# Patient Record
Sex: MALE | Race: WHITE
[De-identification: names, ages, dates, MRNs, and addresses within clinical notes are randomized per-mention and may not be internally consistent; named-entity substitution may affect disease eponyms.]

---

## 2022-06-03 LAB
BUN BLD-MCNC: 25 MG/DL (ref 7–18)
GLUCOSE SERPLBLD-MCNC: 97 MG/DL (ref 74–106)
HCT VFR BLD CALC: 47.5 % (ref 39.6–49)
INR BLD: 0.9
LYMPHOCYTES # SPEC AUTO: 1.5 K/UL (ref 0.7–4.9)
PMV BLD: 10.3 FL (ref 7.6–11.3)
POTASSIUM SERPL-SCNC: 4.4 MMOL/L (ref 3.5–5.1)
RBC # BLD: 5.11 M/UL (ref 4.33–5.43)

## 2022-06-03 NOTE — RAD REPORT
EXAM DESCRIPTION:  RAD - Chest Pa And Lat (2 Views) - 6/3/2022 3:17 pm

 

CLINICAL HISTORY:  pre op pending TURBT

Chest pain.

 

COMPARISON:  Chest Pa And Lat (2 Views) dated 7/1/2020; Chest Pa And Lat (2 Views) dated 4/17/2017; C
hest Pa And Lat (2 Views) dated 7/25/2016

 

FINDINGS:  Right hemidiaphragm is elevated, chronic. Mild atelectasis seen in the right lung base. Th
e lungs are otherwise grossly clear. The heart is normal in size. Sternotomy wires.

## 2022-06-07 ENCOUNTER — HOSPITAL ENCOUNTER (OUTPATIENT)
Dept: HOSPITAL 97 - OR | Age: 77
Discharge: HOME | End: 2022-06-07
Attending: UROLOGY
Payer: COMMERCIAL

## 2022-06-07 VITALS — SYSTOLIC BLOOD PRESSURE: 159 MMHG | TEMPERATURE: 96.4 F | DIASTOLIC BLOOD PRESSURE: 64 MMHG | OXYGEN SATURATION: 95 %

## 2022-06-07 DIAGNOSIS — R31.0: ICD-10-CM

## 2022-06-07 DIAGNOSIS — N40.1: ICD-10-CM

## 2022-06-07 DIAGNOSIS — N18.9: ICD-10-CM

## 2022-06-07 DIAGNOSIS — I10: ICD-10-CM

## 2022-06-07 DIAGNOSIS — C67.9: Primary | ICD-10-CM

## 2022-06-07 DIAGNOSIS — Z20.822: ICD-10-CM

## 2022-06-07 DIAGNOSIS — Z85.51: ICD-10-CM

## 2022-06-07 PROCEDURE — 87086 URINE CULTURE/COLONY COUNT: CPT

## 2022-06-07 PROCEDURE — 52234 CYSTOSCOPY AND TREATMENT: CPT

## 2022-06-07 PROCEDURE — 88307 TISSUE EXAM BY PATHOLOGIST: CPT

## 2022-06-07 PROCEDURE — 36415 COLL VENOUS BLD VENIPUNCTURE: CPT

## 2022-06-07 PROCEDURE — 80048 BASIC METABOLIC PNL TOTAL CA: CPT

## 2022-06-07 PROCEDURE — 71046 X-RAY EXAM CHEST 2 VIEWS: CPT

## 2022-06-07 PROCEDURE — 3E0K705 INTRODUCTION OF OTHER ANTINEOPLASTIC INTO GENITOURINARY TRACT, VIA NATURAL OR ARTIFICIAL OPENING: ICD-10-PCS

## 2022-06-07 PROCEDURE — 88305 TISSUE EXAM BY PATHOLOGIST: CPT

## 2022-06-07 PROCEDURE — 51720 TREATMENT OF BLADDER LESION: CPT

## 2022-06-07 PROCEDURE — 0TBB8ZX EXCISION OF BLADDER, VIA NATURAL OR ARTIFICIAL OPENING ENDOSCOPIC, DIAGNOSTIC: ICD-10-PCS

## 2022-06-07 PROCEDURE — 85610 PROTHROMBIN TIME: CPT

## 2022-06-07 PROCEDURE — 85025 COMPLETE CBC W/AUTO DIFF WBC: CPT

## 2022-06-07 PROCEDURE — 87088 URINE BACTERIA CULTURE: CPT

## 2022-06-07 RX ADMIN — LIDOCAINE HYDROCHLORIDE ONE ML: 20 JELLY TOPICAL at 15:29

## 2022-06-07 RX ADMIN — LIDOCAINE HYDROCHLORIDE ONE ML: 20 JELLY TOPICAL at 14:52

## 2023-07-27 LAB
ALBUMIN SERPL BCP-MCNC: 3.9 G/DL (ref 3.4–5)
ALP SERPL-CCNC: 112 U/L (ref 45–117)
ALT SERPL W P-5'-P-CCNC: 21 U/L (ref 16–61)
AST SERPL W P-5'-P-CCNC: 13 U/L (ref 15–37)
BUN BLD-MCNC: 34 MG/DL (ref 7–18)
GLUCOSE SERPLBLD-MCNC: 119 MG/DL (ref 74–106)
HCT VFR BLD CALC: 48.3 % (ref 39.6–49)
INR BLD: 0.8
LYMPHOCYTES # SPEC AUTO: 2 K/UL (ref 0.7–4.9)
MCV RBC: 93.2 FL (ref 80–100)
PMV BLD: 10.2 FL (ref 7.6–11.3)
POTASSIUM SERPL-SCNC: 4.6 MEQ/L (ref 3.5–5.1)
RBC # BLD: 5.18 M/UL (ref 4.33–5.43)
WBC # BLD AUTO: 7.6 THOU/UL (ref 4.3–10.9)

## 2023-07-27 NOTE — RAD REPORT
EXAM DESCRIPTION:  RAD - Chest Pa And Lat (2 Views) - 7/27/2023 2:38 pm

 

CLINICAL HISTORY:  pre op for surgery, htn, quadruple heart bypass

 

COMPARISON:  Chest Pa And Lat (2 Views) dated 6/3/2022; Chest Pa And Lat (2 Views) dated 7/1/2020; Ch
est Pa And Lat (2 Views) dated 4/17/2017; Chest Pa And Lat (2 Views) dated 7/25/2016

 

FINDINGS:  Lines: None.

Lungs: No evidence of edema or pneumonia. Chronically elevated right hemidiaphragm.

Pleural: No significant pleural effusions or pneumothorax.

Cardiac: The heart size is within normal limits.

Mediastinum: Within normal limits.

Bones: No acute fractures. Sternotomy.

Other: None

 

IMPRESSION:  Chronic elevation of the right hemidiaphragm. No new or acute process otherwise identifi
ed.

## 2023-07-28 NOTE — EKG
Test Date:    2023-07-27               Test Time:    14:41:44

Technician:   JAMAR                                     

                                                     

MEASUREMENT RESULTS:                                       

Intervals:                                           

Rate:         80                                     

NV:           266                                    

QRSD:         84                                     

QT:           370                                    

QTc:          426                                    

Axis:                                                

P:                                                   

NV:           266                                    

QRS:          9                                      

T:            81                                     

                                                     

INTERPRETIVE STATEMENTS:                                       

                                                     

Sinus rhythm with 1st degree AV block

Otherwise normal ECG

Compared to ECG 07/01/2020 10:58:00

First degree AV block now present

Atrial premature complex(es) no longer present



Electronically Signed On 07-28-23 13:24:31 CDT by Myke Mederos

## 2023-08-01 ENCOUNTER — HOSPITAL ENCOUNTER (OUTPATIENT)
Dept: HOSPITAL 97 - OR | Age: 78
Discharge: HOME | End: 2023-08-01
Attending: UROLOGY
Payer: COMMERCIAL

## 2023-08-01 VITALS — SYSTOLIC BLOOD PRESSURE: 158 MMHG | OXYGEN SATURATION: 97 % | DIASTOLIC BLOOD PRESSURE: 77 MMHG | TEMPERATURE: 97.1 F

## 2023-08-01 DIAGNOSIS — N40.1: ICD-10-CM

## 2023-08-01 DIAGNOSIS — D09.0: ICD-10-CM

## 2023-08-01 DIAGNOSIS — N32.9: Primary | ICD-10-CM

## 2023-08-01 PROCEDURE — 52204 CYSTOSCOPY W/BIOPSY(S): CPT

## 2023-08-01 PROCEDURE — 85025 COMPLETE CBC W/AUTO DIFF WBC: CPT

## 2023-08-01 PROCEDURE — 52601 PROSTATECTOMY (TURP): CPT

## 2023-08-01 PROCEDURE — 87086 URINE CULTURE/COLONY COUNT: CPT

## 2023-08-01 PROCEDURE — 36415 COLL VENOUS BLD VENIPUNCTURE: CPT

## 2023-08-01 PROCEDURE — 85610 PROTHROMBIN TIME: CPT

## 2023-08-01 PROCEDURE — 87088 URINE BACTERIA CULTURE: CPT

## 2023-08-01 PROCEDURE — 93005 ELECTROCARDIOGRAM TRACING: CPT

## 2023-08-01 PROCEDURE — 0VT08ZZ RESECTION OF PROSTATE, VIA NATURAL OR ARTIFICIAL OPENING ENDOSCOPIC: ICD-10-PCS

## 2023-08-01 PROCEDURE — 80053 COMPREHEN METABOLIC PANEL: CPT

## 2023-08-01 PROCEDURE — 88331 PATH CONSLTJ SURG 1 BLK 1SPC: CPT

## 2023-08-01 PROCEDURE — 71046 X-RAY EXAM CHEST 2 VIEWS: CPT

## 2023-08-01 PROCEDURE — 88305 TISSUE EXAM BY PATHOLOGIST: CPT

## 2023-08-01 PROCEDURE — 0TBB8ZX EXCISION OF BLADDER, VIA NATURAL OR ARTIFICIAL OPENING ENDOSCOPIC, DIAGNOSTIC: ICD-10-PCS

## 2023-08-01 NOTE — OP
Surgeon:  HERMILA FLOREZ



Preoperative Diagnoses:  

1.Erythematous bladder lesion.

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

3.BPH with lower urinary tract obstruction and symptoms.



Postoperative Diagnoses:  

1.Erythematous bladder lesion.

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

3.BPH with lower urinary tract obstruction and symptoms.



Principal Procedures:  

1.Cystoscopy with bladder biopsies and fulguration.

2.Bipolar transurethral resection of the prostate.



Indication For Procedure:  Mr. Pedro is an 78-year-old gentleman with a history of low-grade urothel
ial carcinoma of the bladder with recurrence observed in 2022 of the low-grade urothelial carcinoma. 
 Subsequent followup revealed the presence of an erythematous patch, which was biopsied and revealed 
CIS.  He received an induction course of BCG and presents today for restaging of his bladder cancer. 
 He also had significant obstructive LUTS due to BPH refractory to Flomax plus finasteride that he ha
s been on for 5-6 years.  As a result, he wished to proceed with surgical management of his obstructi
on if possible, and we agreed that if the frozen section pathology of his bladder biopsy was nonsuspi
cious, we would proceed with the TURP, as the area of erythema noted in the bladder was in the region
 of prior biopsy/resection site scar where inflammatory changes frequently seen.



Procedure In Detail:  The patient was consented in the preoperative holding area before being transfe
rred to the operative suite where general anesthesia was induced.  He was given ampicillin 2 g and ge
ntamicin 100 mg IV antimicrobial prophylaxis and pneumo boots were provided for DVT prophylaxis.  He 
was placed in the lithotomy position, padded and secured to the table appropriately after general ane
sthesia was induced.  His genitalia were prepped with Hibiclens and he was draped in standard fashion
.  The case was begun using a 22-[place] rigid cystoscope to traverse the urethra and into the bladder
 with ease.  The significant extended prostatic urethral length as well as interdigitating lateral lo
bar hypertrophy was again noted.  Within the bladder, no other papillary mucosal lesions, foreign bod
ies or stones were noted, and the area of erythema previously observed in the right lateral wall just
 lateral to the right ureteral orifice where a prior biopsy/resection had been undertaken was still n
oted to be somewhat erythematous.  As a result, I used cold cup biopsy forceps and sterile water to b
iopsy a portion of that erythematous tissue and sent it for frozen section analysis.  I then biopsied
 additional areas essentially resecting the erythematous patch observed and sent that separately as t
issue for permanent analysis in formalin.  We then awaited the results of the frozen section patholog
y with the caveat of them being a very limited analysis since frozen section of the bladder biopsies 
are not typically performed, with the disclaimer of the potential for missed diagnosis in this case, 
once the frozen section pathology returned negative for suspicious signs of malignancy though a great
 degree of artifact was seen secondary to the frozen section methodology, I then proceeded with bipol
ar TURP. 



As a result, I utilized urethral sounds to dilate his meatus and fossa navicularis to 28-[place].  I t
hen placed the 26-[place] bipolar resectoscope with the visual obturator through his urethra and into 
his bladder with ease.  I then began resection of the prostatic urethra starting with some scar tissu
e at the bladder neck observed from prior resection and then continued the resection of the significa
nt residual adenoma existing from the apex all the way essentially to the bladder neck interdigitatin
g throughout.  I thus resected the median bar down to the level of the verumontanum and continued the
 resection initially with the left lateral lobe at the bladder neck region and mid gland region exten
ding to the anterior zone of the prostate at the bladder neck and mid zone region.  I similarly resec
homero the similar component on the right side at the bladder neck in mid gland region extending from po
sterior to anterior.  I then continued the resection from the mid gland into the apex on the left ramana
e and then similarly on the right side from posterior to anterior.  Continued resection was performed
 to remove the interdigitating adenoma until it was no longer interdigitating particularly at the ape
x.  I then continued the resection of the prostatic fossa in the mid gland in order to ensure a widel
y patent channel evident from the verumontanum into the bladder neck.  After extensive resection was 
performed with fulguration at multiple steps along the way because the patient was somewhat oozy due 
to significant inflammatory change observed within his prostate and some areas of comedo-necrosis or 
potentially microabscesses, which I unroofed, I ensured to Ellik evacuate all prostate chips from wit
hin his bladder or remove them under direct vision.  I then resected additionally with the bladder co
mpletely decompressed and the prostate fossa collapsed in order to ensure lack of significant residua
l adenoma.  Once a nice continuous channel was observed, the bladder completely decompressed and the 
prostatic fossa was completely hemostatic with the bladder decompressed and no fluid inflow, I then e
nsured the ureteral orifices were intact, which they were, and that all prostate chips had been remov
ed before then back-filling his bladder and removing the resectoscope to ensure no chips remained wit
hin the bulbar and pendulous urethra.  Once the scope was removed, I then utilized a catheter guide t
o pass a 24-[place] 3-way Bach catheter with ease into his bladder, and I placed 45 cc of sterile anurag
er in the balloon.  The patient was taken out of the lithotomy position, and the catheter was placed 
to moderate traction and very slow drip CBI with normal saline was initiated and the efflux of urine 
was completely clear.  Of note, I irrigated the catheter before connecting it to CBI to ensure no sig
nificant bleeding or tissue left within his bladder, and there was none.  As a result, the patient wa
s then awakened from general anesthesia, transferred to a stretcher, and then transferred to the stephanie
very room in good condition.



Complications:  None.



Discharge Disposition:  I would like for him to keep the urethral Bach catheter through the weekend 
and he may undergo a voiding trial on Tuesday in the office once I am back in town.  Of note, there w
as a bit of a trough that was created posteriorly that may make placement of a standard straight uret
hral Bach catheter difficult; hence, my use of a catheter guide earlier.  As a result, if he fails t
he voiding trial, he would require placement of at least a 20-[place] coude tipped catheter to ensure 
to navigate that live appropriately.  Alternatively, he may require cystoscopy and catheter placement
 over a wire.  Subsequent followup should then be established with me in about 1-2 weeks' time to dis
cuss the results of the pathology of the biopsy and prostate chips.





WR/MODL

DD:  08/01/2023 13:53:56Voice ID:  [id number]

DT:  08/01/2023 18:42:40Report ID:  [id number]

## 2024-11-21 LAB
ANION GAP SERPL CALC-SCNC: 9.4 MEQ/L (ref 5–15)
BUN BLD-MCNC: 37 MG/DL (ref 7–18)
GLUCOSE SERPLBLD-MCNC: 89 MG/DL (ref 74–106)
HCT VFR BLD CALC: 46.7 % (ref 39.6–49)
HGB BLD-MCNC: 15.2 G/DL (ref 13.6–17.9)
INR BLD: 0.87
LYMPHOCYTES # SPEC AUTO: 1.3 K/UL (ref 0.7–4.9)
MCH RBC QN AUTO: 31.4 PG (ref 27–35)
MCHC RBC AUTO-ENTMCNC: 32.5 G/DL (ref 32–36)
MCV RBC: 96.5 FL (ref 80–100)
NRBC # BLD: 0 10*3/UL (ref 0–0)
NRBC BLD AUTO-RTO: 0 % (ref 0–0)
PMV BLD: 11.1 FL (ref 7.6–11.3)
POTASSIUM SERPL-SCNC: 4.4 MEQ/L (ref 3.5–5.1)
PROTHROMBIN TIME: 9.8 SECONDS (ref 9.4–12.5)
RBC # BLD: 4.84 M/UL (ref 4.33–5.43)
WBC # BLD AUTO: 7.1 THOU/UL (ref 4.3–10.9)

## 2024-11-26 ENCOUNTER — HOSPITAL ENCOUNTER (OUTPATIENT)
Dept: HOSPITAL 97 - OR | Age: 79
Discharge: HOME | End: 2024-11-26
Attending: UROLOGY
Payer: COMMERCIAL

## 2024-11-26 VITALS — DIASTOLIC BLOOD PRESSURE: 74 MMHG | OXYGEN SATURATION: 98 % | SYSTOLIC BLOOD PRESSURE: 163 MMHG

## 2024-11-26 VITALS — TEMPERATURE: 97.6 F

## 2024-11-26 DIAGNOSIS — N50.89: ICD-10-CM

## 2024-11-26 DIAGNOSIS — Z86.008: ICD-10-CM

## 2024-11-26 DIAGNOSIS — D09.0: Primary | ICD-10-CM

## 2024-11-26 DIAGNOSIS — N32.9: ICD-10-CM

## 2024-11-26 PROCEDURE — 52204 CYSTOSCOPY W/BIOPSY(S): CPT

## 2024-11-26 PROCEDURE — 93005 ELECTROCARDIOGRAM TRACING: CPT

## 2024-11-26 PROCEDURE — 87088 URINE BACTERIA CULTURE: CPT

## 2024-11-26 PROCEDURE — 85025 COMPLETE CBC W/AUTO DIFF WBC: CPT

## 2024-11-26 PROCEDURE — 80048 BASIC METABOLIC PNL TOTAL CA: CPT

## 2024-11-26 PROCEDURE — 36415 COLL VENOUS BLD VENIPUNCTURE: CPT

## 2024-11-26 PROCEDURE — 88305 TISSUE EXAM BY PATHOLOGIST: CPT

## 2024-11-26 PROCEDURE — 71046 X-RAY EXAM CHEST 2 VIEWS: CPT

## 2024-11-26 PROCEDURE — 85610 PROTHROMBIN TIME: CPT

## 2024-11-26 PROCEDURE — 0TBB8ZX EXCISION OF BLADDER, VIA NATURAL OR ARTIFICIAL OPENING ENDOSCOPIC, DIAGNOSTIC: ICD-10-PCS

## 2024-11-26 PROCEDURE — 87086 URINE CULTURE/COLONY COUNT: CPT

## 2024-11-26 RX ADMIN — GENTAMICIN SULFATE ONE MLS: 40 INJECTION, SOLUTION INTRAMUSCULAR; INTRAVENOUS at 11:58

## 2024-11-26 RX ADMIN — Medication ONE MG: at 13:30

## 2024-11-26 RX ADMIN — SODIUM CHLORIDE ONE MLS: 0.9 INJECTION, SOLUTION INTRAVENOUS at 16:15

## 2024-11-26 RX ADMIN — SODIUM CHLORIDE, SODIUM LACTATE, POTASSIUM CHLORIDE, AND CALCIUM CHLORIDE ONE MLS: .6; .31; .03; .02 INJECTION, SOLUTION INTRAVENOUS at 09:25

## 2024-11-26 RX ADMIN — TRAMADOL HYDROCHLORIDE AND ACETAMINOPHEN ONE: 37.5; 325 TABLET, FILM COATED ORAL at 13:03

## 2024-11-26 RX ADMIN — TRAMADOL HYDROCHLORIDE ONE MG: 50 TABLET, COATED ORAL at 13:36

## 2024-11-26 RX ADMIN — AMPICILLIN SODIUM ONE GM: 2 INJECTION, POWDER, FOR SOLUTION INTRAMUSCULAR; INTRAVENOUS at 12:02

## 2025-04-08 LAB
ANION GAP SERPL CALC-SCNC: 9.4 MEQ/L (ref 5–15)
HGB BLD-MCNC: 14.7 G/DL (ref 13.6–17.9)
INR BLD: 0.88
LYMPHOCYTES # SPEC AUTO: 2.2 K/UL (ref 0.7–4.9)
MCHC RBC AUTO-ENTMCNC: 35.1 G/DL (ref 32–36)
MCV RBC: 96.9 FL (ref 80–100)
PMV BLD: 10.7 FL (ref 7.6–11.3)
POTASSIUM SERPL-SCNC: 4.4 MEQ/L (ref 3.5–5.1)
PROTHROMBIN TIME: 10.1 SECONDS (ref 10–13)
RBC # BLD: 4.33 M/UL (ref 4.33–5.43)

## 2025-04-15 ENCOUNTER — HOSPITAL ENCOUNTER (OUTPATIENT)
Dept: HOSPITAL 97 - OR | Age: 80
Discharge: HOME | End: 2025-04-15
Attending: UROLOGY
Payer: COMMERCIAL

## 2025-04-15 VITALS — SYSTOLIC BLOOD PRESSURE: 161 MMHG | DIASTOLIC BLOOD PRESSURE: 72 MMHG | TEMPERATURE: 97.1 F | OXYGEN SATURATION: 100 %

## 2025-04-15 DIAGNOSIS — C67.9: Primary | ICD-10-CM

## 2025-04-15 PROCEDURE — 87086 URINE CULTURE/COLONY COUNT: CPT

## 2025-04-15 PROCEDURE — 0TBB8ZX EXCISION OF BLADDER, VIA NATURAL OR ARTIFICIAL OPENING ENDOSCOPIC, DIAGNOSTIC: ICD-10-PCS

## 2025-04-15 PROCEDURE — 80048 BASIC METABOLIC PNL TOTAL CA: CPT

## 2025-04-15 PROCEDURE — 85610 PROTHROMBIN TIME: CPT

## 2025-04-15 PROCEDURE — 85025 COMPLETE CBC W/AUTO DIFF WBC: CPT

## 2025-04-15 PROCEDURE — 36415 COLL VENOUS BLD VENIPUNCTURE: CPT

## 2025-04-15 PROCEDURE — 88305 TISSUE EXAM BY PATHOLOGIST: CPT

## 2025-04-15 PROCEDURE — 87088 URINE BACTERIA CULTURE: CPT

## 2025-04-15 PROCEDURE — 52204 CYSTOSCOPY W/BIOPSY(S): CPT

## 2025-04-15 RX ADMIN — SODIUM CHLORIDE, SODIUM LACTATE, POTASSIUM CHLORIDE, AND CALCIUM CHLORIDE ONE MLS: .6; .31; .03; .02 INJECTION, SOLUTION INTRAVENOUS at 08:20

## 2025-04-15 RX ADMIN — CEFAZOLIN ONE GM: 2 INJECTION, POWDER, FOR SOLUTION INTRAMUSCULAR; INTRAVENOUS at 09:15

## 2025-05-20 ENCOUNTER — HOSPITAL ENCOUNTER (OUTPATIENT)
Dept: HOSPITAL 97 - DS | Age: 80
Discharge: HOME | End: 2025-05-20
Attending: UROLOGY
Payer: COMMERCIAL

## 2025-05-20 VITALS
OXYGEN SATURATION: 94 % | BODY MASS INDEX: 35.4 KG/M2 | SYSTOLIC BLOOD PRESSURE: 153 MMHG | DIASTOLIC BLOOD PRESSURE: 72 MMHG

## 2025-05-20 VITALS — TEMPERATURE: 98 F

## 2025-05-20 DIAGNOSIS — Z86.008: ICD-10-CM

## 2025-05-20 DIAGNOSIS — C67.9: Primary | ICD-10-CM

## 2025-05-20 DIAGNOSIS — Z88.5: ICD-10-CM

## 2025-05-20 LAB
SQUAMOUS URNS QL MICRO: <5 /HPF
UA COMPLETE W REFLEX CULTURE PNL UR: (no result)
UA DIPSTICK W REFLEX MICRO PNL UR: (no result)

## 2025-05-20 PROCEDURE — 51720 TREATMENT OF BLADDER LESION: CPT

## 2025-05-20 PROCEDURE — 3E0K705 INTRODUCTION OF OTHER ANTINEOPLASTIC INTO GENITOURINARY TRACT, VIA NATURAL OR ARTIFICIAL OPENING: ICD-10-PCS

## 2025-05-20 PROCEDURE — 81001 URINALYSIS AUTO W/SCOPE: CPT

## 2025-05-20 PROCEDURE — 87088 URINE BACTERIA CULTURE: CPT

## 2025-05-20 PROCEDURE — 87086 URINE CULTURE/COLONY COUNT: CPT

## 2025-05-20 RX ADMIN — DIAZEPAM ONE MG: 5 TABLET ORAL at 07:55

## 2025-05-20 RX ADMIN — OXYBUTYNIN CHLORIDE ONE MG: 5 TABLET, EXTENDED RELEASE ORAL at 07:45

## 2025-05-20 RX ADMIN — LIDOCAINE HYDROCHLORIDE ONE ML: 20 JELLY TOPICAL at 08:19
